# Patient Record
Sex: FEMALE | Race: WHITE | NOT HISPANIC OR LATINO | Employment: FULL TIME | URBAN - METROPOLITAN AREA
[De-identification: names, ages, dates, MRNs, and addresses within clinical notes are randomized per-mention and may not be internally consistent; named-entity substitution may affect disease eponyms.]

---

## 2021-09-27 ENCOUNTER — OFFICE VISIT (OUTPATIENT)
Dept: NEUROLOGY | Facility: CLINIC | Age: 51
End: 2021-09-27

## 2021-09-27 ENCOUNTER — LAB (OUTPATIENT)
Dept: LAB | Facility: HOSPITAL | Age: 51
End: 2021-09-27

## 2021-09-27 VITALS
TEMPERATURE: 97.9 F | WEIGHT: 149 LBS | BODY MASS INDEX: 23.39 KG/M2 | HEIGHT: 67 IN | OXYGEN SATURATION: 98 % | DIASTOLIC BLOOD PRESSURE: 72 MMHG | HEART RATE: 76 BPM | SYSTOLIC BLOOD PRESSURE: 124 MMHG

## 2021-09-27 DIAGNOSIS — G37.9 DEMYELINATING DISEASE OF CENTRAL NERVOUS SYSTEM (HCC): ICD-10-CM

## 2021-09-27 DIAGNOSIS — G37.9 DEMYELINATING DISEASE OF CENTRAL NERVOUS SYSTEM (HCC): Primary | ICD-10-CM

## 2021-09-27 LAB
ALBUMIN SERPL-MCNC: 4.7 G/DL (ref 3.5–5.2)
ALBUMIN/GLOB SERPL: 1.9 G/DL
ALP SERPL-CCNC: 67 U/L (ref 39–117)
ALT SERPL W P-5'-P-CCNC: 17 U/L (ref 1–33)
ANION GAP SERPL CALCULATED.3IONS-SCNC: 9 MMOL/L (ref 5–15)
AST SERPL-CCNC: 22 U/L (ref 1–32)
BASOPHILS # BLD AUTO: 0.08 10*3/MM3 (ref 0–0.2)
BASOPHILS NFR BLD AUTO: 0.8 % (ref 0–1.5)
BILIRUB SERPL-MCNC: 0.2 MG/DL (ref 0–1.2)
BUN SERPL-MCNC: 12 MG/DL (ref 6–20)
BUN/CREAT SERPL: 16.9 (ref 7–25)
CALCIUM SPEC-SCNC: 9.3 MG/DL (ref 8.6–10.5)
CHLORIDE SERPL-SCNC: 104 MMOL/L (ref 98–107)
CHROMATIN AB SERPL-ACNC: <10 IU/ML (ref 0–14)
CO2 SERPL-SCNC: 25 MMOL/L (ref 22–29)
CREAT SERPL-MCNC: 0.71 MG/DL (ref 0.57–1)
DEPRECATED RDW RBC AUTO: 42.6 FL (ref 37–54)
EOSINOPHIL # BLD AUTO: 0.11 10*3/MM3 (ref 0–0.4)
EOSINOPHIL NFR BLD AUTO: 1.1 % (ref 0.3–6.2)
ERYTHROCYTE [DISTWIDTH] IN BLOOD BY AUTOMATED COUNT: 12.5 % (ref 12.3–15.4)
ERYTHROCYTE [SEDIMENTATION RATE] IN BLOOD: 8 MM/HR (ref 0–20)
FOLATE SERPL-MCNC: >20 NG/ML (ref 4.78–24.2)
GFR SERPL CREATININE-BSD FRML MDRD: 87 ML/MIN/1.73
GLOBULIN UR ELPH-MCNC: 2.5 GM/DL
GLUCOSE SERPL-MCNC: 76 MG/DL (ref 65–99)
HBA1C MFR BLD: 4.6 % (ref 4.8–5.6)
HCT VFR BLD AUTO: 40.4 % (ref 34–46.6)
HGB BLD-MCNC: 13.8 G/DL (ref 12–15.9)
IMM GRANULOCYTES # BLD AUTO: 0.03 10*3/MM3 (ref 0–0.05)
IMM GRANULOCYTES NFR BLD AUTO: 0.3 % (ref 0–0.5)
LYMPHOCYTES # BLD AUTO: 1.19 10*3/MM3 (ref 0.7–3.1)
LYMPHOCYTES NFR BLD AUTO: 12.3 % (ref 19.6–45.3)
MCH RBC QN AUTO: 31.5 PG (ref 26.6–33)
MCHC RBC AUTO-ENTMCNC: 34.2 G/DL (ref 31.5–35.7)
MCV RBC AUTO: 92.2 FL (ref 79–97)
MONOCYTES # BLD AUTO: 0.52 10*3/MM3 (ref 0.1–0.9)
MONOCYTES NFR BLD AUTO: 5.4 % (ref 5–12)
NEUTROPHILS NFR BLD AUTO: 7.72 10*3/MM3 (ref 1.7–7)
NEUTROPHILS NFR BLD AUTO: 80.1 % (ref 42.7–76)
NRBC BLD AUTO-RTO: 0 /100 WBC (ref 0–0.2)
PLATELET # BLD AUTO: 279 10*3/MM3 (ref 140–450)
PMV BLD AUTO: 11.1 FL (ref 6–12)
POTASSIUM SERPL-SCNC: 4.1 MMOL/L (ref 3.5–5.2)
PROT SERPL-MCNC: 7.2 G/DL (ref 6–8.5)
RBC # BLD AUTO: 4.38 10*6/MM3 (ref 3.77–5.28)
SODIUM SERPL-SCNC: 138 MMOL/L (ref 136–145)
VIT B12 BLD-MCNC: 602 PG/ML (ref 211–946)
WBC # BLD AUTO: 9.65 10*3/MM3 (ref 3.4–10.8)

## 2021-09-27 PROCEDURE — 86235 NUCLEAR ANTIGEN ANTIBODY: CPT

## 2021-09-27 PROCEDURE — 85025 COMPLETE CBC W/AUTO DIFF WBC: CPT

## 2021-09-27 PROCEDURE — 80053 COMPREHEN METABOLIC PANEL: CPT

## 2021-09-27 PROCEDURE — 82746 ASSAY OF FOLIC ACID SERUM: CPT

## 2021-09-27 PROCEDURE — 86255 FLUORESCENT ANTIBODY SCREEN: CPT

## 2021-09-27 PROCEDURE — 86038 ANTINUCLEAR ANTIBODIES: CPT

## 2021-09-27 PROCEDURE — 36415 COLL VENOUS BLD VENIPUNCTURE: CPT

## 2021-09-27 PROCEDURE — 83036 HEMOGLOBIN GLYCOSYLATED A1C: CPT

## 2021-09-27 PROCEDURE — 82607 VITAMIN B-12: CPT

## 2021-09-27 PROCEDURE — 86225 DNA ANTIBODY NATIVE: CPT

## 2021-09-27 PROCEDURE — 99244 OFF/OP CNSLTJ NEW/EST MOD 40: CPT | Performed by: PSYCHIATRY & NEUROLOGY

## 2021-09-27 PROCEDURE — 86431 RHEUMATOID FACTOR QUANT: CPT

## 2021-09-27 PROCEDURE — 85652 RBC SED RATE AUTOMATED: CPT

## 2021-09-27 PROCEDURE — 82164 ANGIOTENSIN I ENZYME TEST: CPT

## 2021-09-27 RX ORDER — VITAMIN B COMPLEX
CAPSULE ORAL DAILY
COMMUNITY

## 2021-09-27 RX ORDER — ST. JOHN'S WORT 300 MG
CAPSULE ORAL
COMMUNITY

## 2021-09-27 RX ORDER — MELATONIN
1000 DAILY
COMMUNITY

## 2021-09-27 RX ORDER — DIPHENOXYLATE HYDROCHLORIDE AND ATROPINE SULFATE 2.5; .025 MG/1; MG/1
TABLET ORAL
COMMUNITY

## 2021-09-27 RX ORDER — SODIUM PHOSPHATE,MONO-DIBASIC 19G-7G/118
ENEMA (ML) RECTAL
COMMUNITY

## 2021-09-27 RX ORDER — OMEPRAZOLE 20 MG/1
20 CAPSULE, DELAYED RELEASE ORAL AS NEEDED
COMMUNITY

## 2021-09-27 RX ORDER — FLUTICASONE PROPIONATE 50 MCG
2 SPRAY, SUSPENSION (ML) NASAL DAILY
COMMUNITY

## 2021-09-27 NOTE — PROGRESS NOTES
"Chief Complaint  Numbness    Subjective          Kiya Lawosn presents to [facility] NEUROLOGY     History of Present Illness    51 y.o. female referred by Dr Deann Oivedo for parestheisas.  Intermittent trouble picking up R foot.  Trips going up steps.  Low back pain for many years.  Worse with standing.      Left toes are tingling.  L thumb numbness/tingling    Feeling of cold water splashing in on LE.      HA frequency is one a week.  Tylenol aborts.     Excessive fatigue     Reviewed medical records:    Report R foot dorsiflexion weakness.  Sensation of bugs crawling on arms and legs for last year.  Dizziness.  R LE jerking and sensation of tightness.  Similar episode in 2014 with L F/A numbness and weakness.   10 yr h/o chronic hip and back pain.       Objective   Vital Signs:   /72   Pulse 76   Temp 97.9 °F (36.6 °C)   Ht 170.2 cm (67\")   Wt 67.6 kg (149 lb)   SpO2 98%   BMI 23.34 kg/m²     Physical Exam  Eyes:      Extraocular Movements: EOM normal.      Pupils: Pupils are equal, round, and reactive to light.   Neurological:      Mental Status: She is oriented to person, place, and time.      Coordination: Finger-Nose-Finger Test, Heel to Shin Test and Romberg Test normal.      Gait: Gait is intact. Tandem walk normal.      Deep Tendon Reflexes: Strength normal.      Reflex Scores:       Patellar reflexes are 3+ on the right side and 3+ on the left side.       Achilles reflexes are 3+ on the right side and 3+ on the left side.  Psychiatric:         Speech: Speech normal.          Neurologic Exam     Mental Status   Oriented to person, place, and time.   Registration: recalls 3 of 3 objects. Recall at 5 minutes: recalls 3 of 3 objects. Follows 3 step commands.   Attention: normal. Concentration: normal.   Speech: speech is normal   Level of consciousness: alert  Knowledge: good and consistent with education.   Able to name object. Able to read. Able to repeat. Able to write. " Normal comprehension.     Cranial Nerves     CN II   Visual fields full to confrontation.   Visual acuity: normal  Right visual field deficit: none  Left visual field deficit: none     CN III, IV, VI   Pupils are equal, round, and reactive to light.  Extraocular motions are normal.   Nystagmus: none   Diplopia: none  Ophthalmoparesis: none  Upgaze: normal  Downgaze: normal  Conjugate gaze: present  Vestibulo-ocular reflex: present    CN V   Facial sensation intact.   Right corneal reflex: normal  Left corneal reflex: normal    CN VII   Right facial weakness: none  Left facial weakness: none    CN VIII   Hearing: intact    CN IX, X   Palate: symmetric  Right gag reflex: normal  Left gag reflex: normal    CN XI   Right sternocleidomastoid strength: normal  Left sternocleidomastoid strength: normal    CN XII   Tongue: not atrophic  Fasciculations: absent  Tongue deviation: none    Motor Exam   Muscle bulk: normal  Overall muscle tone: normal  Right arm tone: normal  Left arm tone: normal  Right leg tone: normal  Left leg tone: normal    Strength   Strength 5/5 throughout.   Right anterior tibial: 4/5    Sensory Exam   Light touch normal.   Pinprick normal.     Gait, Coordination, and Reflexes     Gait  Gait: normal    Coordination   Romberg: negative  Finger to nose coordination: normal  Heel to shin coordination: normal  Tandem walking coordination: normal    Tremor   Resting tremor: absent  Intention tremor: absent  Action tremor: absent    Reflexes   Reflexes 2+ except as noted.   Right patellar: 3+  Left patellar: 3+  Right achilles: 3+  Left achilles: 3+  Right ankle clonus: presentCannot walk on R heel       Result Review :   The following data was reviewed by: Hiro Cedeno MD on 09/27/2021:      Data reviewed: Consultant notes PCP          Assessment and Plan    Diagnoses and all orders for this visit:    1. Demyelinating disease of central nervous system (HCC) (Primary)  Assessment & Plan:  Multiple  episodes of neurological sx suggestive of demyelinating disease    MRI B/C    Labs    Orders:  -     JAROD by IFA, Reflex 9-biomarkers profile; Future  -     Angiotensin Converting Enzyme; Future  -     CBC & Differential; Future  -     Comprehensive Metabolic Panel; Future  -     Hemoglobin A1c; Future  -     Neuromyelitis Optica (NMO) Auto Antibody, IgG; Future  -     Rheumatoid Factor; Future  -     Sedimentation Rate; Future  -     Vitamin B12 & Folate; Future  -     Anti-Myelin Oligodendrocyte Glycoprotein (MOG), Serum; Future      Follow Up   No follow-ups on file.  Patient was given instructions and counseling regarding her condition or for health maintenance advice. Please see specific information pulled into the AVS if appropriate.

## 2021-09-28 LAB — ACE SERPL-CCNC: 43 U/L (ref 14–82)

## 2021-09-29 LAB
ANA SPECKLED TITR SER: NORMAL {TITER}
ANA TITR SER IF: POSITIVE {TITER}
CENTROMERE B AB SER-ACNC: <0.2 AI (ref 0–0.9)
CHROMATIN AB SERPL-ACNC: <0.2 AI (ref 0–0.9)
DSDNA AB SER-ACNC: 1 IU/ML (ref 0–9)
ENA JO1 AB SER-ACNC: <0.2 AI (ref 0–0.9)
ENA RNP AB SER-ACNC: 0.2 AI (ref 0–0.9)
ENA SCL70 AB SER-ACNC: <0.2 AI (ref 0–0.9)
ENA SM AB SER-ACNC: <0.2 AI (ref 0–0.9)
ENA SS-A AB SER-ACNC: <0.2 AI (ref 0–0.9)
ENA SS-B AB SER-ACNC: <0.2 AI (ref 0–0.9)
LABORATORY COMMENT REPORT: ABNORMAL
Lab: NORMAL
Lab: NORMAL
MOG AB SER QL CBA IFA: NEGATIVE

## 2021-10-04 LAB — AQP4 H2O CHANNEL IGG SERPL QL: NEGATIVE

## 2021-11-17 ENCOUNTER — TELEPHONE (OUTPATIENT)
Dept: NEUROLOGY | Facility: CLINIC | Age: 51
End: 2021-11-17

## 2021-11-17 DIAGNOSIS — G37.9 DEMYELINATING DISEASE OF CENTRAL NERVOUS SYSTEM (HCC): Primary | ICD-10-CM

## 2021-11-17 NOTE — TELEPHONE ENCOUNTER
Caller: CARLINE  Relationship to Patient: SELF  Phone Number: [phone]  Reason for Call: PT CALLED IN TO CHECK ON THE STATUS OF MRI - SEE NO ORDER PLACED. PLEASE REVIEW AND ADVISE. HERMINIO MARIANO

## 2021-11-17 NOTE — TELEPHONE ENCOUNTER
Left Kiya bains  letting her know orders have been placed and sent to [facility] so she should be hearing from them to schedule.    Also provided her with [facility] schedulings phone # [phone] although they will be working on getting these authorized first. Thanks!

## 2021-12-16 ENCOUNTER — APPOINTMENT (OUTPATIENT)
Dept: MRI IMAGING | Facility: HOSPITAL | Age: 51
End: 2021-12-16

## 2022-01-17 ENCOUNTER — HOSPITAL ENCOUNTER (OUTPATIENT)
Dept: MRI IMAGING | Facility: HOSPITAL | Age: 52
Discharge: HOME | End: 2022-01-17

## 2022-01-17 DIAGNOSIS — G37.9 DEMYELINATING DISEASE OF CENTRAL NERVOUS SYSTEM: ICD-10-CM

## 2022-01-17 PROCEDURE — 0 GADOBENATE DIMEGLUMINE 529 MG/ML SOLUTION: Performed by: PSYCHIATRY & NEUROLOGY

## 2022-01-17 PROCEDURE — A9577 INJ MULTIHANCE: HCPCS | Performed by: PSYCHIATRY & NEUROLOGY

## 2022-01-17 PROCEDURE — 70553 MRI BRAIN STEM W/O & W/DYE: CPT

## 2022-01-17 PROCEDURE — 72156 MRI NECK SPINE W/O & W/DYE: CPT

## 2022-01-17 RX ADMIN — GADOBENATE DIMEGLUMINE 15 ML: 529 INJECTION, SOLUTION INTRAVENOUS at 15:31

## 2022-01-21 ENCOUNTER — OFFICE VISIT (OUTPATIENT)
Dept: NEUROLOGY | Facility: CLINIC | Age: 52
End: 2022-01-21

## 2022-01-21 VITALS
DIASTOLIC BLOOD PRESSURE: 78 MMHG | HEIGHT: 67 IN | HEART RATE: 80 BPM | OXYGEN SATURATION: 98 % | BODY MASS INDEX: 23.39 KG/M2 | SYSTOLIC BLOOD PRESSURE: 126 MMHG | WEIGHT: 149 LBS

## 2022-01-21 DIAGNOSIS — G37.9 DEMYELINATING DISEASE OF CENTRAL NERVOUS SYSTEM: Primary | ICD-10-CM

## 2022-01-21 PROCEDURE — 99214 OFFICE O/P EST MOD 30 MIN: CPT | Performed by: PSYCHIATRY & NEUROLOGY

## 2022-01-21 NOTE — PROGRESS NOTES
"Chief Complaint  Results    Subjective          Kiya Lawson presents to [facility] NEUROLOGY     History of Present Illness    51 y.o. female returns in follow up.  Last visit on 9/27/21 ordered MRI B/C, labs.     MRI B/C, my review of films, 1/17/22 scattered small T2 hyper intensities, 1 - 2 mm syrinx vs central canal     Mild N/T.  Occasionally has trouble picking up right foot.      Intermittent trouble picking up R foot.  Trips going up steps.  Low back pain for many years.  Worse with standing.       Left toes are tingling.  L thumb numbness/tingling     Feeling of cold water splashing in on LE.       HA frequency is one a week.  Tylenol aborts.      Excessive fatigue      Reviewed medical records:     Report R foot dorsiflexion weakness.  Sensation of bugs crawling on arms and legs for last year.  Dizziness.  R LE jerking and sensation of tightness.  Similar episode in 2014 with L F/A numbness and weakness.   10 yr h/o chronic hip and back pain.       Objective   Vital Signs:   /78   Pulse 80   Ht 170.2 cm (67.01\")   Wt 67.6 kg (149 lb)   SpO2 98%   BMI 23.33 kg/m²     Physical Exam  Eyes:      Extraocular Movements: EOM normal.      Pupils: Pupils are equal, round, and reactive to light.   Neurological:      Mental Status: She is oriented to person, place, and time.      Gait: Gait is intact.      Deep Tendon Reflexes:      Reflex Scores:       Patellar reflexes are 3+ on the right side.       Achilles reflexes are 3+ on the right side.  Psychiatric:         Speech: Speech normal.          Neurologic Exam     Mental Status   Oriented to person, place, and time.   Speech: speech is normal   Level of consciousness: alert  Knowledge: good and consistent with education.   Normal comprehension.     Cranial Nerves   Cranial nerves II through XII intact.     CN II   Visual fields full to confrontation.   Visual acuity: normal  Right visual field deficit: none  Left visual field " deficit: none     CN III, IV, VI   Pupils are equal, round, and reactive to light.  Extraocular motions are normal.   Nystagmus: none   Diplopia: none  Ophthalmoparesis: none  Upgaze: normal  Downgaze: normal  Conjugate gaze: present    CN V   Facial sensation intact.   Right corneal reflex: normal  Left corneal reflex: normal    CN VII   Right facial weakness: none  Left facial weakness: none    CN VIII   Hearing: intact    CN IX, X   Palate: symmetric  Right gag reflex: normal  Left gag reflex: normal    CN XI   Right sternocleidomastoid strength: normal  Left sternocleidomastoid strength: normal    CN XII   Tongue: not atrophic  Fasciculations: absent  Tongue deviation: none    Motor Exam   Muscle bulk: normal  Overall muscle tone: normal    Strength   Strength 5/5 except as noted.   Right iliopsoas: 4/5  Right quadriceps: 4/5  Right hamstrin/5  Right glutei: 4/5  Right anterior tibial: 4/5  Right posterior tibial: 4/5  Right peroneal: 4/5  Right gastroc: 4/5    Sensory Exam   Light touch normal.     Gait, Coordination, and Reflexes     Gait  Gait: normal    Tremor   Resting tremor: absent  Intention tremor: absent  Action tremor: absent    Reflexes   Reflexes 2+ except as noted.   Right patellar: 3+  Right achilles: 3+     Result Review :   The following data was reviewed by: Hiro Cedeno MD on 2022:  Common labs    Common Labsle 21    0935 0935 0935   Glucose   76   BUN   12   Creatinine   0.71   eGFR Non African Am   87   Sodium   138   Potassium   4.1   Chloride   104   Calcium   9.3   Albumin   4.70   Total Bilirubin   0.2   Alkaline Phosphatase   67   AST (SGOT)   22   ALT (SGPT)   17   WBC 9.65     Hemoglobin 13.8     Hematocrit 40.4     Platelets 279     Hemoglobin A1C  4.60 (A)    (A) Abnormal value            Data reviewed: Radiologic studies MRI B/C          Assessment and Plan    Diagnoses and all orders for this visit:    1. Demyelinating disease of central nervous  system ([facility]) (Primary)  Assessment & Plan:  R LE with increased tone and weakness    MRI Thoracic spine        Orders:  -     MRI Thoracic Spine With & Without Contrast; Future      Follow Up   No follow-ups on file.  Patient was given instructions and counseling regarding her condition or for health maintenance advice. Please see specific information pulled into the AVS if appropriate.

## 2022-02-18 ENCOUNTER — HOSPITAL ENCOUNTER (OUTPATIENT)
Dept: MRI IMAGING | Facility: HOSPITAL | Age: 52
Discharge: HOME | End: 2022-02-18
Admitting: PSYCHIATRY & NEUROLOGY

## 2022-02-18 DIAGNOSIS — G37.9 DEMYELINATING DISEASE OF CENTRAL NERVOUS SYSTEM: ICD-10-CM

## 2022-02-18 PROCEDURE — 72157 MRI CHEST SPINE W/O & W/DYE: CPT

## 2022-02-18 PROCEDURE — A9577 INJ MULTIHANCE: HCPCS | Performed by: PSYCHIATRY & NEUROLOGY

## 2022-02-18 PROCEDURE — 0 GADOBENATE DIMEGLUMINE 529 MG/ML SOLUTION: Performed by: PSYCHIATRY & NEUROLOGY

## 2022-02-18 RX ADMIN — GADOBENATE DIMEGLUMINE 13 ML: 529 INJECTION, SOLUTION INTRAVENOUS at 15:48

## 2022-02-21 ENCOUNTER — TELEPHONE (OUTPATIENT)
Dept: NEUROLOGY | Facility: CLINIC | Age: 52
End: 2022-02-21

## 2022-02-21 NOTE — TELEPHONE ENCOUNTER
Relayed results to Kiya who states understanding and will see him at her fup appt on 3/18. Thanks!

## 2022-02-21 NOTE — TELEPHONE ENCOUNTER
----- Message from Hiro Cedeno MD sent at 2/19/2022  8:08 AM EST -----  Notify pt her MRI is normal

## 2022-03-18 ENCOUNTER — OFFICE VISIT (OUTPATIENT)
Dept: NEUROLOGY | Facility: CLINIC | Age: 52
End: 2022-03-18

## 2022-03-18 VITALS
TEMPERATURE: 96.8 F | OXYGEN SATURATION: 98 % | SYSTOLIC BLOOD PRESSURE: 116 MMHG | DIASTOLIC BLOOD PRESSURE: 72 MMHG | WEIGHT: 150 LBS | HEART RATE: 70 BPM | RESPIRATION RATE: 16 BRPM | HEIGHT: 67 IN | BODY MASS INDEX: 23.54 KG/M2

## 2022-03-18 DIAGNOSIS — M21.379 FOOT-DROP, UNSPECIFIED LATERALITY: Primary | ICD-10-CM

## 2022-03-18 PROBLEM — G37.9 DEMYELINATING DISEASE OF CENTRAL NERVOUS SYSTEM: Chronic | Status: RESOLVED | Noted: 2021-09-27 | Resolved: 2022-03-18

## 2022-03-18 PROCEDURE — 99214 OFFICE O/P EST MOD 30 MIN: CPT | Performed by: PSYCHIATRY & NEUROLOGY

## 2022-03-18 NOTE — PROGRESS NOTES
"Chief Complaint  Follow-up right leg weakness      Subjective          Kiya Lawson presents to [facility] NEUROLOGY     History of Present Illness    52 y.o. female returns in follow up.  Last visit on 1/21/22 ordered MRI thoracic spine.     MRI B/C 1/17/22 scattered small T2 hyper intensities, 1 - 2 mm syrinx vs central canal      MRI Thoracic, my review of films,  Normal cord     Mild N/T.      R foot difficult to lift up toes.  Walking develops heaviness and stiffness in R LEG in calf.       ps going up steps.  Low back pain for many years.  Worse with standing.       Left toes are tingling.  L thumb numbness/tingling     Feeling of cold water splashing in on LE.       HA frequency is one a week.  Tylenol aborts.      Excessive fatigue      Reviewed medical records:     Report R foot dorsiflexion weakness.  Sensation of bugs crawling on arms and legs for last year.  Dizziness.  R LE jerking and sensation of tightness.  Similar episode in 2014 with L F/A numbness and weakness.   10 yr h/o chronic hip and back pain.       Objective   Vital Signs:   /72   Pulse 70   Temp 96.8 °F (36 °C) (Infrared)   Resp 16   Ht 170.2 cm (67.01\")   Wt 68 kg (150 lb)   SpO2 98%   BMI 23.49 kg/m²     Physical Exam  Eyes:      Extraocular Movements: EOM normal.      Pupils: Pupils are equal, round, and reactive to light.   Neurological:      Mental Status: She is oriented to person, place, and time.      Gait: Gait is intact.      Deep Tendon Reflexes: Strength normal.      Reflex Scores:       Achilles reflexes are 0 on the right side and 0 on the left side.  Psychiatric:         Speech: Speech normal.          Neurologic Exam     Mental Status   Oriented to person, place, and time.   Speech: speech is normal   Level of consciousness: alert  Knowledge: good and consistent with education.   Normal comprehension.     Cranial Nerves   Cranial nerves II through XII intact.     CN II   Visual fields full " to confrontation.   Visual acuity: normal  Right visual field deficit: none  Left visual field deficit: none     CN III, IV, VI   Pupils are equal, round, and reactive to light.  Extraocular motions are normal.   Nystagmus: none   Diplopia: none  Ophthalmoparesis: none  Upgaze: normal  Downgaze: normal  Conjugate gaze: present    CN V   Facial sensation intact.   Right corneal reflex: normal  Left corneal reflex: normal    CN VII   Right facial weakness: none  Left facial weakness: none    CN VIII   Hearing: intact    CN IX, X   Palate: symmetric  Right gag reflex: normal  Left gag reflex: normal    CN XI   Right sternocleidomastoid strength: normal  Left sternocleidomastoid strength: normal    CN XII   Tongue: not atrophic  Fasciculations: absent  Tongue deviation: none    Motor Exam   Muscle bulk: normal  Overall muscle tone: normal    Strength   Strength 5/5 throughout.   Right anterior tibial: 2/5  Right peroneal: 2/5    Sensory Exam   Light touch normal.     Gait, Coordination, and Reflexes     Gait  Gait: normal    Tremor   Resting tremor: absent  Intention tremor: absent  Action tremor: absent    Reflexes   Reflexes 2+ except as noted.   Right achilles: 0  Left achilles: 0     Result Review :   The following data was reviewed by: Hiro Cedeno MD on 03/18/2022:  Common labs    Common Labsle 9/27/21 9/27/21 9/27/21    0935 0935 0935   Glucose   76   BUN   12   Creatinine   0.71   eGFR Non African Am   87   Sodium   138   Potassium   4.1   Chloride   104   Calcium   9.3   Albumin   4.70   Total Bilirubin   0.2   Alkaline Phosphatase   67   AST (SGOT)   22   ALT (SGPT)   17   WBC 9.65     Hemoglobin 13.8     Hematocrit 40.4     Platelets 279     Hemoglobin A1C  4.60 (A)    (A) Abnormal value            Data reviewed: Radiologic studies MRI Thoracic           Assessment and Plan    Diagnoses and all orders for this visit:    1. Foot-drop, unspecified laterality (Primary)  Assessment & Plan:  No evidence of  Brain or cord abnl    Check EMG/NCS B LE for peripheral neuropathy     Orders:  -     Nerve Conduction Test; Future      Follow Up   No follow-ups on file.  Patient was given instructions and counseling regarding her condition or for health maintenance advice. Please see specific information pulled into the AVS if appropriate.

## 2022-05-12 ENCOUNTER — PROCEDURE VISIT (OUTPATIENT)
Dept: NEUROLOGY | Facility: CLINIC | Age: 52
End: 2022-05-12

## 2022-05-12 DIAGNOSIS — M21.379 FOOT-DROP, UNSPECIFIED LATERALITY: Primary | ICD-10-CM

## 2022-05-12 PROCEDURE — 95911 NRV CNDJ TEST 9-10 STUDIES: CPT | Performed by: PSYCHIATRY & NEUROLOGY

## 2022-05-12 PROCEDURE — 95886 MUSC TEST DONE W/N TEST COMP: CPT | Performed by: PSYCHIATRY & NEUROLOGY

## 2022-05-12 NOTE — PROGRESS NOTES
[facility] Neurology [place]   Electrodiagnostic Laboratory    Nerve Conduction & EMG Report        Patient:  Kiya Lawson   Patient ID: [id number]   YOB: 1970  Sex:  female      Exam Physician:  Hiro Cedeno MD       Electromyogram and Nerve Conduction Velocity Procedure Note    Hx: 52 y.o. right handed female with complaint of weakness involving the right foot, numbness in left foot. . Symptoms have been present for several months and were provoked by no clear event. Significant past medical history includes nothing suggestive of neuropathy. Family history no family history of nerve or muscle disease.    Exam: Motor power is distal weakness in the right leg. There is no atrophy. There are no fasciculations. Deep tendon reflexes are hypoactive. Sensory exam is normal.      Edx studies of the B LE were performed to evaluate for peripheral neuropathy.     NCS Examination   For sensory nerve conduction studies, the amplitude is measured peak-to-peak, the latency reported is the distal peak latency, and the conduction velocity, if measured, is determined from onset latencies and is over the forearm.   For motor nerve conduction studies, the amplitude is measured baseline-to-peak, the latency reported is the distal onset latency, the conduction velocity is calculated over the forearm, and the F wave latency is the minimum latency.   Unless otherwise noted, the hand temperature was monitored continuously and remained between 32°C and 36°C during the performance of the NCSs.      Sensory NCS      Nerve / Sites Rec. Site Onset Lat Peak Lat NP Amp PP Amp Segments Distance Velocity     ms ms µV µV  mm m/s   L Sural - Ankle (Calf)      Calf Ankle 1.77 2.60 11.5 24.5 Calf - Ankle 140 79   R Sural - Ankle (Calf)      Calf Ankle 2.14 3.02 10.7 3.7 Calf - Ankle 140 66   L Superficial peroneal - Ankle      Lat leg Ankle 2.97 4.17 8.6 7.5 Lat leg - Ankle 140 47   R Superficial peroneal - Ankle      Lat leg  Ankle 2.81 3.23 4.6 25.3 Lat leg - Ankle 140 50               Motor NCS      Nerve / Sites Muscle Latency Amplitude Amp % Duration Segments Distance Lat Diff Velocity     ms mV % ms  mm ms m/s   L Peroneal - EDB      Ankle EDB 5.10 5.7 100 8.54 Ankle - EDB 80        Fib head EDB 11.72 5.6 97.7 9.01 Fib head - Ankle 320 6.61 48      Pop fossa EDB 13.91 7.5 131 8.18 Pop fossa - Fib head 90 2.19 41   R Peroneal - EDB      Ankle EDB 3.70 7.2 100 7.97 Ankle - EDB 80        Fib head EDB 10.99 7.1 98.9 8.23 Fib head - Ankle 330 7.29 45      Pop fossa EDB 12.55 5.8 79.8 7.66 Pop fossa - Fib head 90 1.56 58   L Tibial - AH      Ankle AH 4.53 9.7 100 7.76 Ankle - AH 80        Pop fossa AH 13.49 4.6 47 8.70 Pop fossa - Ankle 400 8.96 45   R Tibial - AH      Ankle AH 5.21 7.8 100 7.34 Ankle - AH 80        Pop fossa AH 12.92 5.5 69.8 7.92 Pop fossa - Ankle 400 7.71 52               F  Wave      Nerve F Lat M Lat F-M Lat    ms ms ms   L Peroneal - EDB 51.5 4.8 46.7   L Tibial - AH 48.4 4.4 44.1   R Tibial - AH 52.6 5.2 47.3   R Peroneal - EDB 46.9 3.5 43.4               H Reflex      Nerve H Lat    ms   L Tibial - Soleus 28.8   R Tibial - Soleus 29.6           EMG         EMG Summary Table     Spontaneous MUAP Recruitment   Muscle Nerve Roots IA Fib PSW Fasc H.F. Amp Dur. PPP Pattern   L. Tibialis anterior Deep peroneal (Fibular) L4-L5 N None None None None N N N N   R. Tibialis anterior Deep peroneal (Fibular) L4-L5 N None None None None N N N N   L. Tibialis posterior Tibial L4-L5 N None None None None N N N N   R. Tibialis posterior Tibial L4-L5 N None None None None N N N N   L. Vastus lateralis Femoral L2-L4 N None None None None N N N N   R. Vastus lateralis Femoral L2-L4 N None None None None N N N N   L. Biceps femoris (long head) Sciatic (tibial division) L5-S2 N None None None None N N N N   R. Biceps femoris (long head) Sciatic (tibial division) L5-S2 N None None None None N N N N   L. Gastrocnemius (Medial head)  Tibial S1-S2 N None None None None N N N N   R. Gastrocnemius (Medial head) Tibial S1-S2 N None None None None N N N N   L. Iliopsoas Femoral L2-L3 N None None None None N N N N   R. Iliopsoas Femoral L2-L3 N None None None None N N N N       Summary    The motor conduction test was normal in all 4 of the tested nerves: L Peroneal - EDB, R Peroneal - EDB, L Tibial - AH, R Tibial - AH.    The sensory conduction test was performed on 4 nerve(s). The results were normal in 3 nerve(s): L Sural - Ankle (Calf), R Sural - Ankle (Calf), L Superficial peroneal - Ankle. Results outside the specified normal range were found in 1 nerve(s), as follows:  • In the R Superficial peroneal - Ankle study  o the peak amplitude result was reduced for Lat leg stimulation    The F wave study was normal in all 4 of the tested nerves: L Peroneal - EDB, L Tibial - AH, R Tibial - AH, R Peroneal - EDB.    The H reflex study was normal in all 2 of the tested nerves: L Tibial - Soleus, R Tibial - Soleus.    The needle EMG study was normal in all 12 tested muscles: L. Tibialis anterior, R. Tibialis anterior, L. Tibialis posterior, R. Tibialis posterior, L. Vastus lateralis, R. Vastus lateralis, L. Biceps femoris (long head), R. Biceps femoris (long head), L. Gastrocnemius (Medial head), R. Gastrocnemius (Medial head), L. Iliopsoas, R. Iliopsoas.          Conclusion: Normal NCV and EMG of the right lower extremity and left lower extremity          Instrument used:  [employer] Synergy        Performed by:          Hiro Cedeno MD

## 2022-09-16 ENCOUNTER — OFFICE VISIT (OUTPATIENT)
Dept: NEUROLOGY | Facility: CLINIC | Age: 52
End: 2022-09-16

## 2022-09-16 VITALS
BODY MASS INDEX: 22.76 KG/M2 | HEIGHT: 67 IN | WEIGHT: 145 LBS | DIASTOLIC BLOOD PRESSURE: 68 MMHG | OXYGEN SATURATION: 98 % | SYSTOLIC BLOOD PRESSURE: 114 MMHG | HEART RATE: 68 BPM | TEMPERATURE: 96.9 F

## 2022-09-16 DIAGNOSIS — R25.2 SPASTICITY: Primary | ICD-10-CM

## 2022-09-16 PROCEDURE — 99213 OFFICE O/P EST LOW 20 MIN: CPT | Performed by: PSYCHIATRY & NEUROLOGY

## 2022-09-16 RX ORDER — ALBUTEROL SULFATE 90 UG/1
AEROSOL, METERED RESPIRATORY (INHALATION)
COMMUNITY
Start: 2022-06-29

## 2022-09-16 RX ORDER — BRAN 5 G
WAFER ORAL DAILY
COMMUNITY

## 2022-09-16 NOTE — PROGRESS NOTES
"Chief Complaint    Spastic paraparesis     Subjective        Kiya Lawson presents to [facility] NEUROLOGY     History of Present Illness    52 y.o. female returns in follow up.  Last visit on 5/12/22 performed EMG/NCS.     EMG/NCS - B LE normal     MRI B/C 1/17/22 scattered small T2 hyper intensities, 1 - 2 mm syrinx vs central canal      MRI Thoracic Normal cord      Mild N/T.       R foot difficult to lift up toes.  Walking develops heaviness and stiffness in R LEG in calf.        Trouble going up steps.  Low back pain for many years.  Worse with standing.      R LE feels heavy.     Left toes are tingling.  L thumb numbness/tingling     Feeling of cold water splashing in on LE.       HA frequency is one a week.  Tylenol aborts.      Excessive fatigue      Reviewed medical records:     Report R foot dorsiflexion weakness.  Sensation of bugs crawling on arms and legs for last year.  Dizziness.  R LE jerking and sensation of tightness.  Similar episode in 2014 with L F/A numbness and weakness.   10 yr h/o chronic hip and back pain.          Objective   Vital Signs:  /68   Pulse 68   Temp 96.9 °F (36.1 °C)   Ht 170.2 cm (67.01\")   Wt 65.8 kg (145 lb)   SpO2 98%   BMI 22.70 kg/m²   Estimated body mass index is 22.7 kg/m² as calculated from the following:    Height as of this encounter: 170.2 cm (67.01\").    Weight as of this encounter: 65.8 kg (145 lb).          Physical Exam  Eyes:      Extraocular Movements: EOM normal.      Pupils: Pupils are equal, round, and reactive to light.   Neurological:      Mental Status: She is oriented to person, place, and time.      Gait: Gait is intact.   Psychiatric:         Speech: Speech normal.          Neurologic Exam     Mental Status   Oriented to person, place, and time.   Speech: speech is normal   Level of consciousness: alert  Knowledge: good and consistent with education.   Normal comprehension.     Cranial Nerves   Cranial nerves II " through XII intact.     CN II   Visual fields full to confrontation.   Visual acuity: normal  Right visual field deficit: none  Left visual field deficit: none     CN III, IV, VI   Pupils are equal, round, and reactive to light.  Extraocular motions are normal.   Nystagmus: none   Diplopia: none  Ophthalmoparesis: none  Upgaze: normal  Downgaze: normal  Conjugate gaze: present    CN V   Facial sensation intact.   Right corneal reflex: normal  Left corneal reflex: normal    CN VII   Right facial weakness: none  Left facial weakness: none    CN VIII   Hearing: intact    CN IX, X   Palate: symmetric  Right gag reflex: normal  Left gag reflex: normal    CN XI   Right sternocleidomastoid strength: normal  Left sternocleidomastoid strength: normal    CN XII   Tongue: not atrophic  Fasciculations: absent  Tongue deviation: none    Motor Exam   Muscle bulk: normal  Overall muscle tone: normal    Strength   Strength 5/5 except as noted.   Right iliopsoas: 5/5  Right quadriceps: 5/5  Right hamstrin/5  Right glutei: 4/5  Right anterior tibial: 4/5  Right posterior tibial: 4/5  Right peroneal: 4/5  Right gastroc: 4/5    Sensory Exam   Light touch normal.     Gait, Coordination, and Reflexes     Gait  Gait: normal    Tremor   Resting tremor: absent  Intention tremor: absent  Action tremor: absent    Reflexes   Reflexes 2+ except as noted.      Result Review :  The following data was reviewed by: Hiro Cedeno MD on 2022:  Common labs    Common Labsle 21    0935 0935 0935   Glucose   76   BUN   12   Creatinine   0.71   eGFR Non African Am   87   Sodium   138   Potassium   4.1   Chloride   104   Calcium   9.3   Albumin   4.70   Total Bilirubin   0.2   Alkaline Phosphatase   67   AST (SGOT)   22   ALT (SGPT)   17   WBC 9.65     Hemoglobin 13.8     Hematocrit 40.4     Platelets 279     Hemoglobin A1C  4.60 (A)    (A) Abnormal value                      Assessment and Plan   Diagnoses and all orders  for this visit:    1. Spasticity (Primary)  Assessment & Plan:  R LE increasing weakness and spasticity              Follow Up   No follow-ups on file.  Patient was given instructions and counseling regarding her condition or for health maintenance advice. Please see specific information pulled into the AVS if appropriate.

## 2023-06-08 ENCOUNTER — OFFICE VISIT (OUTPATIENT)
Dept: NEUROLOGY | Facility: CLINIC | Age: 53
End: 2023-06-08
Payer: COMMERCIAL

## 2023-06-08 VITALS
DIASTOLIC BLOOD PRESSURE: 62 MMHG | WEIGHT: 147.4 LBS | HEART RATE: 62 BPM | HEIGHT: 67 IN | BODY MASS INDEX: 23.13 KG/M2 | SYSTOLIC BLOOD PRESSURE: 118 MMHG | OXYGEN SATURATION: 94 %

## 2023-06-08 DIAGNOSIS — R25.2 SPASTICITY: Primary | ICD-10-CM

## 2023-06-08 PROCEDURE — 99213 OFFICE O/P EST LOW 20 MIN: CPT | Performed by: PSYCHIATRY & NEUROLOGY

## 2023-06-08 NOTE — PROGRESS NOTES
"Chief Complaint    FOOT DROP    Subjective        Kiya Lawson presents to [facility] NEUROLOGY [facility]  History of Present Illness    53 y.o. female returns in follow up.  Last visit on 9/16/22     EMG/NCS - B LE normal      MRI B/C 1/17/22 scattered small T2 hyper intensities, 1 - 2 mm syrinx vs central canal      MRI Thoracic Normal cord      Mild N/T.       R LE weakness increasing. Trouble dorsiflexion and flexion of knee.      R foot difficult to lift up toes.  Walking develops heaviness and stiffness in R LEG in calf.       R LE feels heavy.      Left toes are tingling.  L thumb numbness/tingling     Feeling of cold water splashing in on LE.       HA frequency is one a week.  Tylenol aborts.      Excessive fatigue      Reviewed medical records:     Report R foot dorsiflexion weakness.  Sensation of bugs crawling on arms and legs for last year.  Dizziness.  R LE jerking and sensation of tightness.  Similar episode in 2014 with L F/A numbness and weakness.   10 yr h/o chronic hip and back pain.     Objective   Vital Signs:  /62   Pulse 62   Ht 170.2 cm (67.01\")   Wt 66.9 kg (147 lb 6.4 oz)   SpO2 94%   BMI 23.08 kg/m²   Estimated body mass index is 23.08 kg/m² as calculated from the following:    Height as of this encounter: 170.2 cm (67.01\").    Weight as of this encounter: 66.9 kg (147 lb 6.4 oz).       BMI is within normal parameters. No other follow-up for BMI required.  Neurologic Exam     Mental Status   Oriented to person, place, and time.   Speech: speech is normal   Level of consciousness: alert  Knowledge: good and consistent with education.   Normal comprehension.     Cranial Nerves   Cranial nerves II through XII intact.     CN II   Visual fields full to confrontation.   Visual acuity: normal  Right visual field deficit: none  Left visual field deficit: none     CN III, IV, VI   Pupils are equal, round, and reactive to light.  Extraocular motions are normal. "   Nystagmus: none   Diplopia: none  Ophthalmoparesis: none  Upgaze: normal  Downgaze: normal  Conjugate gaze: present    CN V   Facial sensation intact.   Right corneal reflex: normal  Left corneal reflex: normal    CN VII   Right facial weakness: none  Left facial weakness: none    CN VIII   Hearing: intact    CN IX, X   Palate: symmetric  Right gag reflex: normal  Left gag reflex: normal    CN XI   Right sternocleidomastoid strength: normal  Left sternocleidomastoid strength: normal    CN XII   Tongue: not atrophic  Fasciculations: absent  Tongue deviation: none    Motor Exam   Muscle bulk: normal  Overall muscle tone: normal    Strength   Strength 5/5 except as noted.   Right iliopsoas: 5/5  Right quadriceps: 5/5  Right hamstrin/5  Right glutei: 4/5  Right anterior tibial: 4/5  Right posterior tibial: 4/5  Right peroneal: 4/5  Right gastroc: 4/5    Sensory Exam   Light touch normal.     Gait, Coordination, and Reflexes     Gait  Gait: normal    Tremor   Resting tremor: absent  Intention tremor: absent  Action tremor: absent    Reflexes   Reflexes 2+ except as noted.      Physical Exam  Eyes:      Extraocular Movements: EOM normal.      Pupils: Pupils are equal, round, and reactive to light.   Neurological:      Mental Status: She is oriented to person, place, and time.      Cranial Nerves: Cranial nerves 2-12 are intact.      Gait: Gait is intact.   Psychiatric:         Speech: Speech normal.      Result Review :  The following data was reviewed by: Hiro Cedeno MD on 2023:                 Assessment and Plan   Diagnoses and all orders for this visit:    1. Spasticity (Primary)  Assessment & Plan:  Patient presents with right  foot drop and lower extremity weakness resulting in gait instability and fall risk.  Patient requires an AFO to promote joint stabilization, improved gait pattern and to reduce potential for falls.                  Follow Up   No follow-ups on file.  Patient was given  instructions and counseling regarding her condition or for health maintenance advice. Please see specific information pulled into the AVS if appropriate.

## 2023-06-08 NOTE — ASSESSMENT & PLAN NOTE
Patient presents with right  foot drop and lower extremity weakness resulting in gait instability and fall risk.  Patient requires an AFO to promote joint stabilization, improved gait pattern and to reduce potential for falls.